# Patient Record
Sex: FEMALE | Race: WHITE
[De-identification: names, ages, dates, MRNs, and addresses within clinical notes are randomized per-mention and may not be internally consistent; named-entity substitution may affect disease eponyms.]

---

## 2020-01-16 ENCOUNTER — HOSPITAL ENCOUNTER (EMERGENCY)
Dept: HOSPITAL 72 - EMR | Age: 30
Discharge: HOME | End: 2020-01-16
Payer: SELF-PAY

## 2020-01-16 VITALS — DIASTOLIC BLOOD PRESSURE: 75 MMHG | SYSTOLIC BLOOD PRESSURE: 100 MMHG

## 2020-01-16 VITALS — HEIGHT: 66 IN | WEIGHT: 130 LBS | BODY MASS INDEX: 20.89 KG/M2

## 2020-01-16 DIAGNOSIS — R19.7: ICD-10-CM

## 2020-01-16 DIAGNOSIS — R11.10: Primary | ICD-10-CM

## 2020-01-16 PROCEDURE — 99282 EMERGENCY DEPT VISIT SF MDM: CPT

## 2020-01-16 NOTE — NUR
ED Nurse Note:



 Pt ambulated into ED from home CO  abdominal pain, chills,  N/V and diarrhea 
(3 times today) x 3 days. Pt denies any consumption of raw food or seafood, 
blood in emesis or stool. reports no travel hx. Pt reports others being sick 
around her at work.

## 2020-01-16 NOTE — EMERGENCY ROOM REPORT
History of Present Illness


General


Chief Complaint:  Abdominal Pain


Source:  Patient





Present Illness


HPI


Patient presents with complaints of abdominal discomfort


Previously nausea vomiting reports of vomiting has subsided however she is now 

had diarrhea for the past 3 days





Denies any blood in the stool denies any chest pain or shortness of breath she 

has had some chills and questionable fever





Denies any recent travel denies any dysuria frequency





Patient is tolerating oral intake


Allergies:  


Coded Allergies:  


     No Known Allergies (Unverified , 1/16/20)





Patient History


Past Medical History:  see triage record


Last Menstrual Period:  12/20/2019


Reviewed Nursing Documentation:  PMH: Agreed; PSxH: Agreed





Nursing Documentation-PMH


Past Medical History:  No History, Except For





Review of Systems


All Other Systems:  negative except mentioned in HPI





Physical Exam





Vital Signs








  Date Time  Temp Pulse Resp B/P (MAP) Pulse Ox O2 Delivery O2 Flow Rate FiO2


 


1/16/20 11:16 98.2 73 16 99/74 (82) 99 Room Air  








Sp02 EP Interpretation:  reviewed, normal


General Appearance:  well appearing, no apparent distress


Head:  normocephalic, atraumatic


Eyes:  bilateral eye PERRL, bilateral eye EOMI


ENT:  hearing grossly normal, normal pharynx, TMs + canals normal, uvula midline


Neck:  full range of motion, supple, no meningismus, no bony tend


Respiratory:  lungs clear, normal breath sounds, no rhonchi, no respiratory 

distress, no retraction, no accessory muscle use


Cardiovascular #1:  normal peripheral pulses, regular rate, rhythm, no edema, 

no gallop, no JVD, no murmur


Gastrointestinal:  normal bowel sounds, non tender, soft, no mass, no 

organomegaly, non-distended, no guarding, no hernia, no pulsatile mass, no 

rebound


Genitourinary:  no CVA tenderness


Musculoskeletal:  normal inspection


Neurologic:  motor strength/tone normal, CNs III-XII nml as tested, oriented x3

, sensory intact, responsive


Psychiatric:  mood/affect normal


Skin:  no rash


Lymphatic:  normal inspection, no adenopathy





Medical Decision Making


Diagnostic Impression:  


 Primary Impression:  


 Vomiting


 Additional Impression:  


 Diarrhea


ER Course


Multiple differentials including but not limited to appendicitis, gastritis, 

enteritis, flu symptoms entertained





Patient was offered IV hydration with baseline evaluation of blood work further 

entertainment of


Other emergent process such as ovarian torsion or appendicitis however she 

reports that she has started to feel better





She would like to attempt oral medication for home and rest





Given the exam and presentation I feel this is appropriate and patient will 

have initial conservative outpatient trial





Last Vital Signs








  Date Time  Temp Pulse Resp B/P (MAP) Pulse Ox O2 Delivery O2 Flow Rate FiO2


 


1/16/20 11:16 98.2 73 16 99/74 (82) 99 Room Air  








Status:  unchanged


Disposition:  HOME, SELF-CARE


Condition:  Improved


Scripts


Ondansetron (Zofran) 4 Mg Tablet


4 MG ORAL Q8HR PRN for Nausea & Vomiting, #12 TAB


   Prov: Vanita Rojas DO         1/16/20





Additional Instructions:  


Patient is provided with the discharge instructions notified to follow up with 

primary doctor in the next 2-3 days otherwise return to the er with any 

worsening symptoms.


Please note that this report is being documented using DRAGON technology.  This 

can lead to erroneous entry secondary to incorrect interpretation by the 

dictating instrument.











Vanita Rojas DO Jan 16, 2020 11:38

## 2020-01-16 NOTE — NUR
ER DISCHARGE NOTE:

Patient is cleared to be discharged home per ERMD, pt is aox4, on room air, 
with stable vital signs. pt was given dc and prescription instructions, pt was 
able to verbalize understanding, pt id band removed. pt is able to ambulate 
with steady gait. pt took all belongings.